# Patient Record
Sex: FEMALE | Race: WHITE | NOT HISPANIC OR LATINO | Employment: FULL TIME | URBAN - METROPOLITAN AREA
[De-identification: names, ages, dates, MRNs, and addresses within clinical notes are randomized per-mention and may not be internally consistent; named-entity substitution may affect disease eponyms.]

---

## 2023-03-22 PROBLEM — L23.7 POISON IVY DERMATITIS: Status: ACTIVE | Noted: 2023-03-22

## 2023-03-22 PROBLEM — E28.2 PCOS (POLYCYSTIC OVARIAN SYNDROME): Status: ACTIVE | Noted: 2023-03-22

## 2023-03-22 PROBLEM — R30.0 DYSURIA: Status: ACTIVE | Noted: 2023-03-22

## 2023-03-22 PROBLEM — F43.10 PTSD (POST-TRAUMATIC STRESS DISORDER): Status: ACTIVE | Noted: 2023-03-22

## 2023-03-22 PROBLEM — R22.2 ABDOMINAL WALL MASS: Status: ACTIVE | Noted: 2023-03-22

## 2023-03-22 PROBLEM — R60.0 BILATERAL LOWER EXTREMITY EDEMA: Status: ACTIVE | Noted: 2023-03-22

## 2023-03-22 PROBLEM — B00.1 RECURRENT COLD SORES: Status: ACTIVE | Noted: 2023-03-22

## 2023-03-22 PROBLEM — B35.3 TINEA PEDIS OF RIGHT FOOT: Status: ACTIVE | Noted: 2023-03-22

## 2023-03-23 RX ORDER — CLOTRIMAZOLE 1 %
CREAM (GRAM) TOPICAL 2 TIMES DAILY
COMMUNITY
End: 2023-03-30 | Stop reason: ALTCHOICE

## 2023-03-30 ENCOUNTER — APPOINTMENT (OUTPATIENT)
Dept: PRIMARY CARE | Facility: CLINIC | Age: 38
End: 2023-03-30
Payer: OTHER GOVERNMENT

## 2023-03-30 ENCOUNTER — OFFICE VISIT (OUTPATIENT)
Dept: PRIMARY CARE | Facility: CLINIC | Age: 38
End: 2023-03-30
Payer: OTHER GOVERNMENT

## 2023-03-30 VITALS
DIASTOLIC BLOOD PRESSURE: 76 MMHG | WEIGHT: 237 LBS | RESPIRATION RATE: 16 BRPM | BODY MASS INDEX: 43.61 KG/M2 | HEART RATE: 93 BPM | OXYGEN SATURATION: 96 % | HEIGHT: 62 IN | SYSTOLIC BLOOD PRESSURE: 124 MMHG | TEMPERATURE: 97.8 F

## 2023-03-30 DIAGNOSIS — L98.9 SKIN LESION OF FACE: ICD-10-CM

## 2023-03-30 DIAGNOSIS — K43.2 INCISIONAL HERNIA, WITHOUT OBSTRUCTION OR GANGRENE: Primary | ICD-10-CM

## 2023-03-30 PROBLEM — L23.7 POISON IVY DERMATITIS: Status: RESOLVED | Noted: 2023-03-22 | Resolved: 2023-03-30

## 2023-03-30 PROBLEM — B35.3 TINEA PEDIS OF RIGHT FOOT: Status: RESOLVED | Noted: 2023-03-22 | Resolved: 2023-03-30

## 2023-03-30 PROBLEM — R30.0 DYSURIA: Status: RESOLVED | Noted: 2023-03-22 | Resolved: 2023-03-30

## 2023-03-30 PROCEDURE — 1036F TOBACCO NON-USER: CPT | Performed by: FAMILY MEDICINE

## 2023-03-30 PROCEDURE — 99213 OFFICE O/P EST LOW 20 MIN: CPT | Performed by: FAMILY MEDICINE

## 2023-03-30 NOTE — PROGRESS NOTES
"Subjective   Patient ID: Rocío Cruz is a 37 y.o. female who presents for Hernia.    HPI   Initial visit.    Reports incisional hernia since 2019 due to prior open cholecystectomy.  States this was confirmed on prior imaging in the past.  Wants to discuss treatment options.    c/o bumps on face >1 yr.  Requests derm referral.  Review of Systems   All other systems reviewed and are negative.  Objective   /76   Pulse 93   Temp 36.6 °C (97.8 °F)   Resp 16   Ht 1.568 m (5' 1.75\")   Wt 108 kg (237 lb)   SpO2 96%   BMI 43.70 kg/m²   Physical Exam  Constitutional:       General: She is not in acute distress.     Appearance: She is obese.   Abdominal:      Comments: +Incisional bulge on abdomen   Skin:     Comments: +Bumps on face     Assessment/Plan   Diagnoses and all orders for this visit:  Incisional hernia, without obstruction or gangrene  -     Referral to General Surgery; Future  Skin lesion of face  -     Referral to Dermatology; Future    Referred to dermatology and general surgery.    Schedule annual wellness visit.   "